# Patient Record
(demographics unavailable — no encounter records)

---

## 2025-04-30 NOTE — PLAN
[FreeTextEntry1] : 43 y/o  LMP 25 presents to establish care  HCM f/u pap mammo referral given  Amenorrhea IUP not c/w LMP  discussed that likely this is a miscarriage but no need for intervention at this time. Will have pt follow up in 10 days for repeat sono  discussed miscarriage management options briefly

## 2025-04-30 NOTE — PROCEDURE
[Transvaginal OB Sonogram] : Transvaginal OB Sonogram [Intrauterine Pregnancy] : intrauterine pregnancy [Yolk Sac] : yolk sac present [Fetal Heart] : no fetal heart [CRL: ___ (mm)] : CRL - [unfilled]Umm [Current GA by Sonogram: ___ (wks)] : Current GA by Sonogram: [unfilled]Uwks [___ day(s)] : [unfilled] days [Transvaginal OB Sonogram WNL] : Transvaginal OB Sonogram - abnormalities noted

## 2025-04-30 NOTE — HISTORY OF PRESENT ILLNESS
[Patient reported mammogram was normal] : Patient reported mammogram was normal [Patient reported PAP Smear was normal] : Patient reported PAP Smear was normal [Normal Amount/Duration] :  normal amount and duration [Regular Cycle Intervals] : periods have been regular [Currently Active] : currently active [Men] : men [Vaginal] : vaginal [No] : No [Mammogramdate] : 2024 [PapSmeardate] : 2023 [FreeTextEntry1] : 2/21/25

## 2025-05-12 NOTE — PROCEDURE
[F/U Abnormal US] : f/u abnormal ultrasound [Transvaginal Ultrasound] : transvaginal ultrasound [FreeTextEntry3] : EMT:  0.43cm, thin lining

## 2025-05-12 NOTE — HISTORY OF PRESENT ILLNESS
[FreeTextEntry1] : 45 y/o  LMP 25 presenting for follow up   Had TVUS on  which showed yolk sac started to have bleeding on 5/3 and feels like she passed the pregnancy no more cramping some spotting

## 2025-05-12 NOTE — PLAN
[FreeTextEntry1] : 45 y/o  LMP 25 presenting for follow up   s/p spontaneous miscarriage no further gyn intervention at this time